# Patient Record
Sex: MALE | Race: WHITE | NOT HISPANIC OR LATINO | URBAN - METROPOLITAN AREA
[De-identification: names, ages, dates, MRNs, and addresses within clinical notes are randomized per-mention and may not be internally consistent; named-entity substitution may affect disease eponyms.]

---

## 2024-05-15 ENCOUNTER — OFFICE VISIT (OUTPATIENT)
Dept: ORTHOPEDIC SURGERY | Facility: CLINIC | Age: 24
End: 2024-05-15
Payer: COMMERCIAL

## 2024-05-15 ENCOUNTER — HOSPITAL ENCOUNTER (OUTPATIENT)
Dept: RADIOLOGY | Facility: CLINIC | Age: 24
End: 2024-05-15
Payer: COMMERCIAL

## 2024-05-15 DIAGNOSIS — M67.88 LEFT PERONEAL TENDINOSIS: Primary | ICD-10-CM

## 2024-05-15 DIAGNOSIS — M25.572 CHRONIC PAIN OF LEFT ANKLE: ICD-10-CM

## 2024-05-15 DIAGNOSIS — G89.29 CHRONIC PAIN OF LEFT ANKLE: ICD-10-CM

## 2024-05-15 PROCEDURE — 99204 OFFICE O/P NEW MOD 45 MIN: CPT | Performed by: PEDIATRICS

## 2024-05-15 PROCEDURE — 1036F TOBACCO NON-USER: CPT | Performed by: PEDIATRICS

## 2024-05-15 NOTE — PROGRESS NOTES
Chief Complaint   Patient presents with    Left Ankle - Pain   I saw and evaluated the patient. I personally obtained the key and critical portions of the history and physical exam or was physically present for key and critical portions performed by the resident/fellow. I reviewed the resident/fellow's documentation and discussed the patient with the resident/fellow. I agree with the resident/fellow's medical decision making as documented in the note.  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  Pain has been going on for over 1 year.  We are the fourth provider he has seen for this.  He was diagnosed with left peroneal tendinitis previously.  Saw a podiatrist at the [facility] and was doing physical therapy through their office.  Did 10 weeks of therapy, and was overall unhappy with the therapists he went to and felt that he was not getting customized care.  After the 10 weeks, states his pain is worse, not better.  Third provider he went to provided him with custom orthotics, which she does think has been helping.  States that he does not have pain during normal day-to-day activities, only with heavy weightbearing or with exercise.    Past [facility] HX:  Specialty Problems    None       ROS  12 point ROS reviewed and is negative except for items listed    Social Hx:  Works a desk job    Medications:   No current outpatient medications on file prior to visit.     No current facility-administered medications on file prior to visit.         Allergies:  Not on File     Physical Exam:    Visit Vitals  Smoking Status Never        Vitals reviewed    General appearance: Well-appearing well-nourished  Psych: Normal mood and affect    Neuro: Normal sensation to light touch throughout the involved extremities  Vascular: No extremity edema or discoloration.  Skin: negative.  Lymphatic: no regional lymphadenopathy present.  Eyes: no  conjunctival injection.      BILATERAL   Lower Leg / Ankle / Foot Exam    Inspection:   Pes planus: None  Pes cavus: None  Deformity: None  Soft tissue swelling: None  Erythema: None  Ecchymosis: None  Calf atrophy: None    Range of motion:  Inversion (20-35) full, pain free  Eversion (5-25) full, pain free  Dorsiflexion (20-30) full, pain free  Plantarflexion (40-50) full, pain free  Adduction foot full, pain free  Abduction foot full, pain free    Palpation:  TTP ATFL No  TTP CFL No  TTP Deltoid ligament No  TTP Syndesmosis No  TTP Anterior joint line No  TTP Medial malleolus No  TTP Lateral malleolus No  TTP Tibia No  TTP Fibula No  TTP Talus No  TTP Calcaneus No  TTP Base of the fifth metatarsal No  TTP Navicular No  TTP Cuboid No  TTP Cuneiforms No  TTP Metatarsals No  TTP Phalanges No    TTP Lis franc joint No  TTP MTP joints No  TTP IP joints No    TTP Achilles No  TTP Peroneal tendon No R, + L  TTP Posterior tibialis No  TTP Anterior tibialis No  TTP Extensor hallucis No  TTP Extensor tendons No  TTP Flexor hallucis longus No  TTP Sinus tarsi No  TTP Plantar fascia No    Strength:  Dorsiflexion no pain, 5/5  Plantarflexion no pain, 5/5  Inversion no pain, 5/5  Eversion  no pain, 5/5  Flexion MTP joints no pain, 5/5  Extension MTP joints no pain, 5/5  Flexion IP joints no pain, 5/5  Extension IP joints no pain, 5/5    Hip flexion no pain, 5/5  Hip extension no pain, 5/5  Hip abduction no pain, 5/5  Hip adduction no pain, 5/5  Hamstring no pain, 5/5  Quadriceps no pain, 5/5    Ligament Tests:  Anterior drawer: negative  Talar tilt: negative  Foot external rotation test: negative  Tibia-fibula squeeze test: negative    Special Tests  Calcaneal squeeze: negative  Forefoot squeeze: neg  Forced passive dorsiflexion (anterior impingement): neg  Wilson test: neg  Tinel's: neg at fibular head     Flexibility:   dorsiflexes to neutral    Functional Exam:  Proprioception: fair L worse than R  Single leg toe  raises:dec control L    Hop test: no pain R< pain L peroneal  Hop test: + loss of jump height L  Trendelenburg: -  SL squats: valgus: mild/mod  SL squats: pronation: yes    walking on toes: no pain  walking on heels: no pain    Gait non-antalgic     Imaging:  MR L ankle results reviewed 10/4/23: peroneal tendinopathy  Imaging was personally interpreted and reviewed with the patient and/or family    Impression and Plan:  Jorge Reese is a 24 y.o. male  who likes to be active wt training, running and walking who presented on 05/15/2024  with L ankle pain for about 1 year.  He has done PT without significant improvement - PT caused pain.        Objective: On exam he had TTP peroneal/post tib tendons L, decreased proprioception and jump height, difficulty performing SL toe raises with control L, mild peroneal pain with functional testing     Plan: We discussed consult for possible injection so that he can perform PT more effectively - he does not want to pursue at this time and he feels that pain is not inhibiting his ability to perform PT.  Will start PT - incl modalities such as dry needling.    600 mg ibuprofen 3 times per day with food prn pain  Voltaren 3 times per day        ** Please excuse any errors in grammar or translation related to this dictation. Voice recognition software was utilized to prepare this document. **

## 2024-07-09 NOTE — PROGRESS NOTES
Chief Complaint   Patient presents with    Left Ankle - Follow-up     History of Present Illness:  Jorge Reese is a 24 y.o. male who likes to be active wt training, running and walking who presented on 5/15/2024 for left lateral ankle pain for ~1 year. We are the 4th provider he has seen. He has done 10 wks PT without significant improvement - PT caused pain. He tried custom orthotics which did not help. Pain only with heavy weightbearing or with exercise. On exam, he had TTP peroneal/post tib tendons L, decreased proprioception and jump height, difficulty performing SL toe raises with control L, mild peroneal pain with functional testing. Discussed injection to augment PT however does not want to pursue at this time. Plan PT to include modalities such as dry needling, 600mg ibuprofen 3 times a day with food as needed for pain and voltaren 3 times a day.    7/10/2024 swelling has improved.  No pain, gait has improved.  Has not been going to PT.  Thinks rest and time has improved his sx.    The one time he played bball he noticed decreased agility bilat.  R actually worse.   Dr. Ya told him a muscle almost fell off    Peroneal tendon (plantarflexion) and (eversion)    Past [facility] HX:  Specialty Problems    5/15/2024 Left peroneal tendinopathy        ROS  12 point ROS reviewed and is negative except for items listed    Social Hx:  Works a desk job    Medications:   No current outpatient medications on file prior to visit.     No current facility-administered medications on file prior to visit.         Allergies:  Not on File     Physical Exam:    Visit Vitals  Smoking Status Never        Vitals reviewed    General appearance: Well-appearing well-nourished  Psych: Normal mood and affect    Neuro: Normal sensation to light touch throughout the involved extremities  Vascular: No extremity edema or discoloration.  Skin: negative.  Lymphatic: no regional lymphadenopathy present.  Eyes: no conjunctival  injection.      BILATERAL   Lower Leg / Ankle / Foot Exam    Inspection:   Pes planus: None  Pes cavus: None  Deformity: None  Soft tissue swelling: None  Erythema: None  Ecchymosis: None  Calf atrophy: None    Range of motion:  Inversion (20-35) full, pain free  Eversion (5-25) full, pain free  Dorsiflexion (20-30) full, pain free  Plantarflexion (40-50) full, pain free  Adduction foot full, pain free  Abduction foot full, pain free    Palpation:  TTP ATFL No  TTP CFL No  TTP Deltoid ligament No  TTP Syndesmosis No  TTP Anterior joint line No  TTP Medial malleolus No  TTP Lateral malleolus No  TTP Tibia No  TTP Fibula No  TTP Talus No  TTP Calcaneus No  TTP Base of the fifth metatarsal No  TTP Navicular No  TTP Cuboid No  TTP Cuneiforms No  TTP Metatarsals No  TTP Phalanges No    TTP Lis franc joint No  TTP MTP joints No  TTP IP joints No    TTP Achilles No  TTP Peroneal tendon no   TTP Posterior tibialis none  TTP Anterior tibialis No  TTP Extensor hallucis No  TTP Extensor tendons No  TTP Flexor hallucis longus No  TTP Sinus tarsi No  TTP Plantar fascia No    Strength:  Dorsiflexion no pain, 5/5  Plantarflexion no pain, 5/5  Inversion no pain, 5/5  Eversion  no pain, 5/5  Flexion MTP joints no pain, 5/5  Extension MTP joints no pain, 5/5  Flexion IP joints no pain, 5/5  Extension IP joints no pain, 5/5    Hip flexion no pain, 5/5  Hip extension no pain, 5/5  Hip abduction no pain, 5/5  Hip adduction no pain, 5/5  Hamstring no pain, 5/5  Quadriceps no pain, 5/5    Ligament Tests:  Anterior drawer: negative  Talar tilt: negative  Foot external rotation test: negative  Tibia-fibula squeeze test: negative    Special Tests  Calcaneal squeeze: negative  Forefoot squeeze: neg  Forced passive dorsiflexion (anterior impingement): neg  Wilson test: neg  Tinel's: neg at fibular head     Flexibility:   dorsiflexes to neutral    Functional Exam:  Proprioception: fair L good R  Single leg toe raises: dec control L    Hop  test: no pain  valgus bilat  Hop test: no pain valgus bilat  Trendelenburg: negative  SL squats: valgus:bilat  SL squats: pronation: bilat    walking on toes: no pain  walking on heels: no pain    Gait non-antalgic     Imaging:  MR L ankle results reviewed 10/4/23: peroneal tendinopathy    Imaging was personally interpreted and reviewed with the patient and/or family    Impression and Plan:  Jorge Reese is a 24 y.o. male  who likes to be active wt training, running and walking who presented on 5/15/2024 for left lateral ankle pain for ~1 year.         On 7/10/2024 he was significantly improved.  He has been doing his home exercise program but not going to physical therapy in the office.  On exam he has F ROM, no TTP, 5/5 strength, valgus with single-leg squats bilaterally, no loss of jump height, decreased proprioception left compared to right    I provided him with a full core/hip strengthening program and ankle/foot strength and proprioception program which she can do on his own.  If he would like to return to the physical therapy office to work on higher-level agility activities I would be fine but he does have agility programs that he has done on his own in the past and feels pretty comfortable doing his own program.    It was encouraged him to do his balance exercises for 10 minutes daily and strength exercises 3 days/week.  He will continue to use his over-the-counter orthotics in all of his shoes.    We discussed a gradual return to basketball he should not immediately go back to playing 5 days/week or 2 to 3 hours at a time.    ** Please excuse any errors in grammar or translation related to this dictation. Voice recognition software was utilized to prepare this document. **

## 2024-07-10 ENCOUNTER — APPOINTMENT (OUTPATIENT)
Dept: ORTHOPEDIC SURGERY | Facility: CLINIC | Age: 24
End: 2024-07-10
Payer: COMMERCIAL

## 2024-07-10 DIAGNOSIS — M67.88 LEFT PERONEAL TENDINOSIS: Primary | ICD-10-CM

## 2024-07-10 PROCEDURE — 1036F TOBACCO NON-USER: CPT | Performed by: PEDIATRICS

## 2024-07-10 PROCEDURE — 99214 OFFICE O/P EST MOD 30 MIN: CPT | Performed by: PEDIATRICS
